# Patient Record
Sex: MALE | Race: WHITE | URBAN - METROPOLITAN AREA
[De-identification: names, ages, dates, MRNs, and addresses within clinical notes are randomized per-mention and may not be internally consistent; named-entity substitution may affect disease eponyms.]

---

## 2019-05-07 ENCOUNTER — NEW PATIENT (OUTPATIENT)
Dept: URBAN - METROPOLITAN AREA CLINIC 9 | Facility: CLINIC | Age: 49
End: 2019-05-07

## 2019-05-07 ASSESSMENT — VISUAL ACUITY
OS: 20/25
OD: 20/20

## 2019-05-07 ASSESSMENT — KERATOMETRY
OD: 42.25
OS: 42.15

## 2019-06-05 ENCOUNTER — OFFICE VISIT (OUTPATIENT)
Dept: URBAN - METROPOLITAN AREA CLINIC 23 | Facility: CLINIC | Age: 49
End: 2019-06-05
Payer: COMMERCIAL

## 2019-06-05 DIAGNOSIS — H52.03 HYPERMETROPIA, BILATERAL: Primary | ICD-10-CM

## 2019-06-05 PROCEDURE — 99203 OFFICE O/P NEW LOW 30 MIN: CPT | Performed by: OPHTHALMOLOGY

## 2019-06-05 PROCEDURE — 99214 OFFICE O/P EST MOD 30 MIN: CPT | Performed by: OPHTHALMOLOGY

## 2019-06-05 ASSESSMENT — VISUAL ACUITY
OD: 20/20
OS: 20/20

## 2019-06-05 ASSESSMENT — INTRAOCULAR PRESSURE
OD: 11
OS: 11

## 2019-06-05 ASSESSMENT — KERATOMETRY
OS: 42.50
OD: 42.50

## 2019-06-05 NOTE — IMPRESSION/PLAN
Impression: Hypermetropia, bilateral
Dominant eye OS Plan: Discussed diagnosis in detail with patient. Discussed treatment options with patient. Surgical risks and benefits were discussed, explained and understood by patient. If patient desires recommend refractive lensectomy OU. RL2. Patient will consider lensectomy.